# Patient Record
Sex: MALE
[De-identification: names, ages, dates, MRNs, and addresses within clinical notes are randomized per-mention and may not be internally consistent; named-entity substitution may affect disease eponyms.]

---

## 2020-07-21 PROBLEM — Z00.00 ENCOUNTER FOR PREVENTIVE HEALTH EXAMINATION: Status: ACTIVE | Noted: 2020-07-21

## 2020-07-27 ENCOUNTER — APPOINTMENT (OUTPATIENT)
Dept: UROLOGY | Facility: CLINIC | Age: 45
End: 2020-07-27
Payer: COMMERCIAL

## 2020-07-27 VITALS — HEIGHT: 69 IN | BODY MASS INDEX: 32.29 KG/M2 | TEMPERATURE: 98.6 F | WEIGHT: 218 LBS

## 2020-07-27 DIAGNOSIS — Z78.9 OTHER SPECIFIED HEALTH STATUS: ICD-10-CM

## 2020-07-27 DIAGNOSIS — N21.0 CALCULUS IN BLADDER: ICD-10-CM

## 2020-07-27 DIAGNOSIS — N20.0 CALCULUS OF KIDNEY: ICD-10-CM

## 2020-07-27 DIAGNOSIS — F17.290 NICOTINE DEPENDENCE, OTHER TOBACCO PRODUCT, UNCOMPLICATED: ICD-10-CM

## 2020-07-27 DIAGNOSIS — Z83.3 FAMILY HISTORY OF DIABETES MELLITUS: ICD-10-CM

## 2020-07-27 PROCEDURE — 99204 OFFICE O/P NEW MOD 45 MIN: CPT

## 2020-07-27 PROCEDURE — 99214 OFFICE O/P EST MOD 30 MIN: CPT | Mod: 25

## 2020-07-27 RX ORDER — NAPROXEN 500 MG/1
500 TABLET ORAL
Qty: 20 | Refills: 0 | Status: ACTIVE | COMMUNITY
Start: 2020-05-19

## 2020-07-27 RX ORDER — HYDROCORTISONE 25 MG/G
2.5 CREAM TOPICAL
Qty: 28 | Refills: 0 | Status: ACTIVE | COMMUNITY
Start: 2020-07-23

## 2020-07-27 RX ORDER — KETOCONAZOLE 20 MG/G
2 CREAM TOPICAL
Qty: 60 | Refills: 0 | Status: ACTIVE | COMMUNITY
Start: 2020-07-23

## 2020-07-27 RX ORDER — ALBUTEROL SULFATE 90 UG/1
108 (90 BASE) INHALANT RESPIRATORY (INHALATION)
Qty: 18 | Refills: 0 | Status: ACTIVE | COMMUNITY
Start: 2020-04-23

## 2020-07-27 RX ORDER — MONTELUKAST 10 MG/1
10 TABLET, FILM COATED ORAL
Qty: 30 | Refills: 0 | Status: ACTIVE | COMMUNITY
Start: 2020-05-18

## 2020-08-03 PROBLEM — N20.0 KIDNEY STONE ON LEFT SIDE: Status: ACTIVE | Noted: 2020-07-27

## 2020-08-03 PROBLEM — N20.0 BILATERAL NEPHROLITHIASIS: Status: ACTIVE | Noted: 2020-07-27

## 2020-08-03 PROBLEM — N21.0 BLADDER CALCULUS: Status: ACTIVE | Noted: 2020-07-27

## 2020-08-03 NOTE — ASSESSMENT
[FreeTextEntry1] : 46 yo male presents to office for bladder calculus and bilateral nephrolithiasis. He states he was seen by PMD for pelvic pain 3 weeks ago which has improved on its own. CT showed 1 mm right and ;left renal stone and 5 mm bladder calculus. Voiding well with a good flow. Denies stone passage. Denies gross hematuria, dysuria, flank pain, fever , or chills. No prior stone history. \par \par outside medical records from [facility]:\par CT 7/2020 \par -5 mm bladder calculus\par -1 mm right and left calculus\par \par creatinine 1.12

## 2020-08-03 NOTE — HISTORY OF PRESENT ILLNESS
[FreeTextEntry1] : 46 yo male presents to office for bladder calculus and bilateral nephrolithiasis. He states he was seen by PMD for pelvic pain 3 weeks ago which has improved on its own. CT showed 1 mm right and ;left renal stone and 5 mm bladder calculus. Voiding well with a good flow. Denies stone passage. Denies gross hematuria, dysuria, flank pain, fever , or chills. No prior stone history. \par \par outside medical records from [facility]:\par CT 7/2020 \par -5 mm bladder calculus\par -1 mm right and left calculus\par \par creatinine 1.12 [Unchanged] : unchanged [None] : No relieving factors are noted

## 2021-02-01 ENCOUNTER — APPOINTMENT (OUTPATIENT)
Dept: UROLOGY | Facility: CLINIC | Age: 46
End: 2021-02-01

## 2025-03-07 ENCOUNTER — APPOINTMENT (OUTPATIENT)
Dept: UROLOGY | Facility: CLINIC | Age: 50
End: 2025-03-07